# Patient Record
(demographics unavailable — no encounter records)

---

## 2025-06-26 NOTE — PHYSICAL EXAM
[FreeTextEntry1] : Medical assistant present for duration of physical examination   General no acute distress, alert and oriented Psych calm, pleasant demeanor, responding appropriately to questions Nonlabored breathing Ambulating without assistance  Skin excoriations and irritation of perianal skin extending in  cleft  Anorectal Exam: Inspection skin findings as above, no fissure, no external hemorrhoidal inflammation or thrombosis RUSTY nontender, no masses palpated, no blood on gloved finger   Procedure: Anoscopy   Pre procedure Diagnosis: perianal irritation Post procedure Diagnosis: perianal irritation Anesthesia: none Estimated blood loss: none Specimen: none Complications: none   Consent obtained. Anoscopy was performed by passing a lighted anoscope with lubricant jelly into the anal canal and the entire anal mucosal surface was inspected. Findings included no fissure, no internal hemorrhoidal inflammation, no visible masses or lesions in anal canal   Patient tolerated examination and procedure well.

## 2025-06-26 NOTE — HISTORY OF PRESENT ILLNESS
[FreeTextEntry1] : 76yo female presents for initial evaluation  Reason for visit: "hemorrhoids" Referred by: Refuah   PMH - HTN, HLD, afib (s/p cardioversion), basal cell carcinoma PSH - removal of basal cell carcinoma (nose)   Meds - Eliquis, diovan, norvasc, lipitor, ezetimibe, metoprolol  NKDA  Pregnancies:  ( x7)   Denies family history of CRC or IBD Colonoscopy - Reports last done in  with Dr. Garcia Johns with benign findings.   Patient presents today with concerns of intermittent rectal irritation/itching that started about two years ago after a bowel movement and usually at night when she is about to sleep.  Denies rectal bleeding or tissue protrusion.  Will require multiple wipes before she feels cleans.  Has been using preparation H cream and hydrocortisone suppository with symptom relief but will reoccur again a couple hours later.   Denies caffeine/ETOH intake.   Bowel movement is usually daily. Stool is normal and formed.  States she is on a high fiber diet but denies adequate water intake.   Denies recent use of NSAIDS.  Takes Eliquis daily for afib

## 2025-06-26 NOTE — ASSESSMENT
[FreeTextEntry1] : Exam findings and diagnosis and treatment options were discussed with patient. Recommend Calmol-4 suppositories daily REcommend gentle perianal hygiene. Use water to clean self and recommend balneol cleanser. Discussed topical skin barriers. Avoid itching/scratching/over wiping Can continue with as needed hydrocortisone. Instructions for use reviewed. All questions answered